# Patient Record
Sex: FEMALE | Race: WHITE | NOT HISPANIC OR LATINO | Employment: STUDENT | URBAN - NONMETROPOLITAN AREA
[De-identification: names, ages, dates, MRNs, and addresses within clinical notes are randomized per-mention and may not be internally consistent; named-entity substitution may affect disease eponyms.]

---
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
## 2021-07-20 ENCOUNTER — OFFICE VISIT (OUTPATIENT)
Dept: FAMILY MEDICINE CLINIC | Facility: CLINIC | Age: 21
End: 2021-07-20

## 2021-07-20 ENCOUNTER — LAB (OUTPATIENT)
Dept: LAB | Facility: HOSPITAL | Age: 21
End: 2021-07-20

## 2021-07-20 VITALS
SYSTOLIC BLOOD PRESSURE: 120 MMHG | BODY MASS INDEX: 33 KG/M2 | DIASTOLIC BLOOD PRESSURE: 80 MMHG | WEIGHT: 179.3 LBS | HEIGHT: 62 IN | HEART RATE: 99 BPM | OXYGEN SATURATION: 99 %

## 2021-07-20 DIAGNOSIS — Z12.4 ENCOUNTER FOR SCREENING FOR CERVICAL CANCER: ICD-10-CM

## 2021-07-20 DIAGNOSIS — F41.9 ANXIETY: ICD-10-CM

## 2021-07-20 DIAGNOSIS — Z13.29 SCREENING FOR HYPOTHYROIDISM: ICD-10-CM

## 2021-07-20 DIAGNOSIS — Z11.59 NEED FOR HEPATITIS C SCREENING TEST: ICD-10-CM

## 2021-07-20 DIAGNOSIS — N92.6 IRREGULAR MENSTRUAL BLEEDING: ICD-10-CM

## 2021-07-20 DIAGNOSIS — Z13.220 SCREENING FOR HYPERCHOLESTEROLEMIA: ICD-10-CM

## 2021-07-20 DIAGNOSIS — Z00.00 ANNUAL PHYSICAL EXAM: Primary | ICD-10-CM

## 2021-07-20 LAB
BASOPHILS # BLD AUTO: 0.04 10*3/MM3 (ref 0–0.2)
BASOPHILS NFR BLD AUTO: 0.5 % (ref 0–1.5)
DEPRECATED RDW RBC AUTO: 40.1 FL (ref 37–54)
EOSINOPHIL # BLD AUTO: 0.09 10*3/MM3 (ref 0–0.4)
EOSINOPHIL NFR BLD AUTO: 1.1 % (ref 0.3–6.2)
ERYTHROCYTE [DISTWIDTH] IN BLOOD BY AUTOMATED COUNT: 13 % (ref 12.3–15.4)
HCG SERPL QL: NEGATIVE
HCT VFR BLD AUTO: 36.4 % (ref 34–46.6)
HCV AB SER DONR QL: NORMAL
HGB BLD-MCNC: 11.9 G/DL (ref 12–15.9)
IMM GRANULOCYTES # BLD AUTO: 0.03 10*3/MM3 (ref 0–0.05)
IMM GRANULOCYTES NFR BLD AUTO: 0.4 % (ref 0–0.5)
LYMPHOCYTES # BLD AUTO: 2.83 10*3/MM3 (ref 0.7–3.1)
LYMPHOCYTES NFR BLD AUTO: 34.4 % (ref 19.6–45.3)
MCH RBC QN AUTO: 27.5 PG (ref 26.6–33)
MCHC RBC AUTO-ENTMCNC: 32.7 G/DL (ref 31.5–35.7)
MCV RBC AUTO: 84.3 FL (ref 79–97)
MONOCYTES # BLD AUTO: 0.73 10*3/MM3 (ref 0.1–0.9)
MONOCYTES NFR BLD AUTO: 8.9 % (ref 5–12)
NEUTROPHILS NFR BLD AUTO: 4.5 10*3/MM3 (ref 1.7–7)
NEUTROPHILS NFR BLD AUTO: 54.7 % (ref 42.7–76)
NRBC BLD AUTO-RTO: 0 /100 WBC (ref 0–0.2)
PLATELET # BLD AUTO: 300 10*3/MM3 (ref 140–450)
PMV BLD AUTO: 10.4 FL (ref 6–12)
RBC # BLD AUTO: 4.32 10*6/MM3 (ref 3.77–5.28)
WBC # BLD AUTO: 8.22 10*3/MM3 (ref 3.4–10.8)

## 2021-07-20 PROCEDURE — 80061 LIPID PANEL: CPT

## 2021-07-20 PROCEDURE — 84703 CHORIONIC GONADOTROPIN ASSAY: CPT

## 2021-07-20 PROCEDURE — 86803 HEPATITIS C AB TEST: CPT

## 2021-07-20 PROCEDURE — 80050 GENERAL HEALTH PANEL: CPT

## 2021-07-20 PROCEDURE — 99395 PREV VISIT EST AGE 18-39: CPT | Performed by: NURSE PRACTITIONER

## 2021-07-20 NOTE — PROGRESS NOTES
"Chief Complaint  Annual Exam    Subjective  Has been living with her boyfriend for close to a year and stopped taking her birth control.  Reports menstrual cycles have been irregular.  \"Pretty sure last month I had a miscarriage so        Nikkie Ross presents to [facility]  HPI:  Annual physical exam   Menstrual Problem  This is a chronic problem. The current episode started more than 1 month ago. The problem occurs intermittently. The problem has been waxing and waning. Associated symptoms include abdominal pain. Nothing aggravates the symptoms. She has tried nothing for the symptoms. The treatment provided no relief.       Objective   Vital Signs:   /80   Pulse 99   Ht 157.5 cm (62\")   Wt 81.3 kg (179 lb 4.8 oz)   SpO2 99%   BMI 32.79 kg/m²     Physical Exam  Vitals and nursing note reviewed. Exam conducted with a chaperone present.   Constitutional:       Appearance: Normal appearance. She is well-developed. She is obese.   HENT:      Head: Normocephalic and atraumatic.      Right Ear: Tympanic membrane, ear canal and external ear normal.      Left Ear: Tympanic membrane, ear canal and external ear normal.      Mouth/Throat:      Mouth: Mucous membranes are moist.      Pharynx: Oropharynx is clear.   Eyes:      Extraocular Movements: Extraocular movements intact.      Conjunctiva/sclera: Conjunctivae normal.      Pupils: Pupils are equal, round, and reactive to light.   Cardiovascular:      Rate and Rhythm: Normal rate and regular rhythm.      Pulses: Normal pulses.      Heart sounds: Normal heart sounds.   Pulmonary:      Effort: Pulmonary effort is normal.      Breath sounds: Normal breath sounds.   Chest:      Comments: Bilateral, manual breast exam performed and no dimpling, puckering, masses or nodules palpated    Abdominal:      General: Bowel sounds are normal.      Palpations: Abdomen is soft.   Genitourinary:     General: Normal vulva.      Labia:    "      Right: No rash or tenderness.         Left: No rash or tenderness.       Urethra: No prolapse.      Vagina: Normal.      Cervix: Normal.      Uterus: Absent.       Adnexa: Right adnexa normal and left adnexa normal.      Rectum: Normal.   Musculoskeletal:         General: Normal range of motion.      Cervical back: Normal range of motion and neck supple.   Skin:     General: Skin is warm.      Capillary Refill: Capillary refill takes less than 2 seconds.   Neurological:      Mental Status: She is alert and oriented to person, place, and time.   Psychiatric:         Behavior: Behavior normal.        Result Review :                   Assessment and Plan    Diagnoses and all orders for this visit:    1. Annual physical exam (Primary)    2. Irregular menstrual bleeding  -     CBC & Differential; Future  -     Comprehensive Metabolic Panel; Future  -     hCG, Serum, Qualitative; Future    3. Screening for hypercholesterolemia  -     Lipid panel; Future    4. Screening for hypothyroidism  -     TSH; Future    5. Encounter for screening for cervical cancer   -     Liquid-based Pap Smear, Screening    6. Need for hepatitis C screening test  -     Hepatitis C antibody; Future      Complete fasting lab work as ordered and will notify of results when available  Encouraged to begin prenatal vitamins with folic acid as she has been attempting pregnancy since discontinuing contraception  ThinPrep Pap smear collected and sent to lab and will notify of results when available  Encouraged bimonthly manual breast exams at home  Counseled on importance of healthy eating habits and regular physical activity regimen on improving overall physical mental health  I spent 26 minutes caring for Nikkie on this date of service. This time includes time spent by me in the following activities:preparing for the visit, reviewing tests, obtaining and/or reviewing a separately obtained history, performing a medically appropriate examination  and/or evaluation , counseling and educating the patient/family/caregiver, ordering medications, tests, or procedures and documenting information in the medical record  Follow Up   Return in about 3 months (around 10/20/2021) for Recheck.  Patient was given instructions and counseling regarding her condition or for health maintenance advice. Please see specific information pulled into the AVS if appropriate.         This document has been electronically signed by TOSHA Mcnamara on July 20, 2021 12:39 CDT

## 2021-07-21 LAB
ALBUMIN SERPL-MCNC: 4.7 G/DL (ref 3.5–5.2)
ALBUMIN/GLOB SERPL: 2.1 G/DL
ALP SERPL-CCNC: 72 U/L (ref 39–117)
ALT SERPL W P-5'-P-CCNC: 17 U/L (ref 1–33)
ANION GAP SERPL CALCULATED.3IONS-SCNC: 12.8 MMOL/L (ref 5–15)
AST SERPL-CCNC: 19 U/L (ref 1–32)
BILIRUB SERPL-MCNC: 0.4 MG/DL (ref 0–1.2)
BUN SERPL-MCNC: 8 MG/DL (ref 6–20)
BUN/CREAT SERPL: 14.5 (ref 7–25)
CALCIUM SPEC-SCNC: 9.2 MG/DL (ref 8.6–10.5)
CHLORIDE SERPL-SCNC: 106 MMOL/L (ref 98–107)
CHOLEST SERPL-MCNC: 124 MG/DL (ref 0–200)
CO2 SERPL-SCNC: 22.2 MMOL/L (ref 22–29)
CREAT SERPL-MCNC: 0.55 MG/DL (ref 0.57–1)
GFR SERPL CREATININE-BSD FRML MDRD: 141 ML/MIN/1.73
GLOBULIN UR ELPH-MCNC: 2.2 GM/DL
GLUCOSE SERPL-MCNC: 83 MG/DL (ref 65–99)
HDLC SERPL-MCNC: 50 MG/DL (ref 40–60)
LDLC SERPL CALC-MCNC: 61 MG/DL (ref 0–100)
LDLC/HDLC SERPL: 1.24 {RATIO}
POTASSIUM SERPL-SCNC: 4.1 MMOL/L (ref 3.5–5.2)
PROT SERPL-MCNC: 6.9 G/DL (ref 6–8.5)
SODIUM SERPL-SCNC: 141 MMOL/L (ref 136–145)
TRIGL SERPL-MCNC: 60 MG/DL (ref 0–150)
TSH SERPL DL<=0.05 MIU/L-ACNC: 1.43 UIU/ML (ref 0.27–4.2)
VLDLC SERPL-MCNC: 13 MG/DL (ref 5–40)

## 2021-07-27 ENCOUNTER — TELEPHONE (OUTPATIENT)
Dept: FAMILY MEDICINE CLINIC | Facility: CLINIC | Age: 21
End: 2021-07-27

## 2021-07-27 LAB
LAB AP CASE REPORT: NORMAL
PATH INTERP SPEC-IMP: NORMAL

## 2021-07-27 NOTE — TELEPHONE ENCOUNTER
Per TOSHA Benitez, Ms. Ross has been called with recent Pap Smear results & recommendations.  Continue current medications and follow-up as planned or sooner if any problems.         ----- Message from TOSHA Mcnamara sent at 7/27/2021  9:04 AM CDT -----  Pap smear was unsatisfactory due to the lubricant used.  She needs to have a repeat Pap smear performed.  Have her schedule appointment with my office.  We will be using a different lubricant at that time

## 2021-07-27 NOTE — PROGRESS NOTES
Per TOSHA Benitez, Ms. Ross has been called with recent Pap Smear results & recommendations.  Continue current medications and follow-up as planned or sooner if any problems.

## 2022-10-13 ENCOUNTER — LAB (OUTPATIENT)
Dept: LAB | Facility: HOSPITAL | Age: 22
End: 2022-10-13

## 2022-10-13 ENCOUNTER — OFFICE VISIT (OUTPATIENT)
Dept: FAMILY MEDICINE CLINIC | Facility: CLINIC | Age: 22
End: 2022-10-13

## 2022-10-13 VITALS
DIASTOLIC BLOOD PRESSURE: 66 MMHG | WEIGHT: 193.7 LBS | SYSTOLIC BLOOD PRESSURE: 108 MMHG | BODY MASS INDEX: 35.64 KG/M2 | OXYGEN SATURATION: 99 % | HEIGHT: 62 IN | HEART RATE: 118 BPM

## 2022-10-13 DIAGNOSIS — Z11.8 SCREENING FOR CHLAMYDIAL DISEASE: ICD-10-CM

## 2022-10-13 DIAGNOSIS — Z23 NEED FOR IMMUNIZATION AGAINST INFLUENZA: ICD-10-CM

## 2022-10-13 DIAGNOSIS — Z00.00 ANNUAL PHYSICAL EXAM: Primary | ICD-10-CM

## 2022-10-13 DIAGNOSIS — N92.6 IRREGULAR MENSTRUAL BLEEDING: ICD-10-CM

## 2022-10-13 PROCEDURE — 87661 TRICHOMONAS VAGINALIS AMPLIF: CPT

## 2022-10-13 PROCEDURE — 90471 IMMUNIZATION ADMIN: CPT | Performed by: NURSE PRACTITIONER

## 2022-10-13 PROCEDURE — 3008F BODY MASS INDEX DOCD: CPT | Performed by: NURSE PRACTITIONER

## 2022-10-13 PROCEDURE — 90686 IIV4 VACC NO PRSV 0.5 ML IM: CPT | Performed by: NURSE PRACTITIONER

## 2022-10-13 PROCEDURE — 2014F MENTAL STATUS ASSESS: CPT | Performed by: NURSE PRACTITIONER

## 2022-10-13 PROCEDURE — 87491 CHLMYD TRACH DNA AMP PROBE: CPT

## 2022-10-13 PROCEDURE — 83002 ASSAY OF GONADOTROPIN (LH): CPT

## 2022-10-13 PROCEDURE — 99395 PREV VISIT EST AGE 18-39: CPT | Performed by: NURSE PRACTITIONER

## 2022-10-13 PROCEDURE — 83001 ASSAY OF GONADOTROPIN (FSH): CPT

## 2022-10-13 PROCEDURE — 82670 ASSAY OF TOTAL ESTRADIOL: CPT

## 2022-10-13 PROCEDURE — 80050 GENERAL HEALTH PANEL: CPT

## 2022-10-13 PROCEDURE — 36415 COLL VENOUS BLD VENIPUNCTURE: CPT

## 2022-10-13 PROCEDURE — 87591 N.GONORRHOEAE DNA AMP PROB: CPT

## 2022-10-13 NOTE — PROGRESS NOTES
"Chief Complaint  Annual Exam    Subjective   Over the past several years has been having irregular menstrual periods \"but I started tracking them in my samantha over a year ago and they are all over the place.  They are really heavy and super crampy.\"          Nikkie Ross presents to [facility] [place]  History of Present Illness  HPI: Annual physical exam  Menstrual Problem  This is a chronic problem. The current episode started more than 1 year ago. The problem occurs constantly. The problem has been gradually worsening. Associated symptoms include abdominal pain and fatigue. Nothing aggravates the symptoms. She has tried nothing for the symptoms. The treatment provided no relief.     No current outpatient medications on file prior to visit.     No current facility-administered medications on file prior to visit.     Allergies   Allergen Reactions   • Codeine Rash     Hives.       Objective   Vital Signs:  /66   Pulse 118   Ht 157.5 cm (62\")   Wt 87.9 kg (193 lb 11.2 oz)   SpO2 99%   BMI 35.43 kg/m²   Estimated body mass index is 35.43 kg/m² as calculated from the following:    Height as of this encounter: 157.5 cm (62\").    Weight as of this encounter: 87.9 kg (193 lb 11.2 oz).        Physical Exam  Vitals and nursing note reviewed.   Constitutional:       Appearance: Normal appearance. She is well-developed. She is obese.   HENT:      Head: Normocephalic and atraumatic.      Right Ear: Tympanic membrane, ear canal and external ear normal.      Left Ear: Tympanic membrane, ear canal and external ear normal.      Nose: Nose normal.      Mouth/Throat:      Mouth: Mucous membranes are moist.      Pharynx: Oropharynx is clear.   Eyes:      Extraocular Movements: Extraocular movements intact.      Conjunctiva/sclera: Conjunctivae normal.      Pupils: Pupils are equal, round, and reactive to light.   Cardiovascular:      Rate and Rhythm: Normal rate and " regular rhythm.      Pulses: Normal pulses.      Heart sounds: Normal heart sounds.   Pulmonary:      Effort: Pulmonary effort is normal.      Breath sounds: Normal breath sounds.   Chest:      Comments: Bilateral, manual breast exam performed and no dimpling, puckering, masses or nodules palpated    Abdominal:      General: Bowel sounds are normal.      Palpations: Abdomen is soft.   Genitourinary:     Comments: Gynecological exam deferred to OB/GYN due to her being on menstrual cycle at this time  Musculoskeletal:         General: Normal range of motion.      Cervical back: Normal range of motion and neck supple.   Skin:     General: Skin is warm.      Capillary Refill: Capillary refill takes less than 2 seconds.   Neurological:      Mental Status: She is alert and oriented to person, place, and time.   Psychiatric:         Behavior: Behavior normal.        Result Review :  The following data was reviewed by: TOSHA Mcnamara on 10/13/2022:  CMP    CMP 10/13/22   Glucose 80   BUN 9   Creatinine 0.64   Sodium 140   Potassium 3.9   Chloride 105   Calcium 8.9   Albumin 4.70   Total Bilirubin 0.5   Alkaline Phosphatase 75   AST (SGOT) 21   ALT (SGPT) 10           CBC    CBC 10/13/22   WBC 10.15   RBC 4.30   Hemoglobin 12.2   Hematocrit 36.3   MCV 84.4   MCH 28.4   MCHC 33.6   RDW 13.3   Platelets 351                     Assessment and Plan   Diagnoses and all orders for this visit:    1. Annual physical exam (Primary)    2. Irregular menstrual bleeding  -     CBC & Differential; Future  -     Comprehensive Metabolic Panel; Future  -     TSH; Future  -     Estradiol; Future  -     FSH & LH; Future  -     US Non-ob Transvaginal; Future  -     Ambulatory Referral to Obstetrics / Gynecology    3. Need for immunization against influenza  -     FluLaval/Fluzone >6 mos ([id number])    4. Screening for chlamydial disease  -     Chlamydia trachomatis, Neisseria gonorrhoeae, Trichomonas vaginalis, PCR - Urine, Urine, Clean  Catch; Future      1.  Annual physical exam:  Continue on current medications as previously prescribed   Counseling on importance of heathy eating habits and regular physical activity regimen on improving overall physical and mental health.     2.  Irregular menstrual chlamydia, gonorrhea:  Complete CBC, chemistry panel, TSH, estradiol, FSH and LH as ordered and will notify of results when available  Radiology will call to schedule transvaginal ultrasound will notify of results when available  Referral placed to OB/GYN and will contact patient to schedule appointment    30.  Need for immunization against influenza:  Influenza vaccine given IM in office  Educated on possible side effects of this medication including but not limited to pain, swelling and redness of injection site as well as flulike symptoms    4.  Screening for chlamydial disease:  Complete chlamydia PCR as ordered and will notify of results when available       Follow Up   Return in about 1 year (around 10/13/2023) for Annual physical.  Patient was given instructions and counseling regarding her condition or for health maintenance advice. Please see specific information pulled into the AVS if appropriate.         This document has been electronically signed by TOSHA Mcnamara on October 14, 2022 06:59 CDT

## 2022-10-14 ENCOUNTER — TELEPHONE (OUTPATIENT)
Dept: FAMILY MEDICINE CLINIC | Facility: CLINIC | Age: 22
End: 2022-10-14

## 2022-10-14 LAB
ALBUMIN SERPL-MCNC: 4.7 G/DL (ref 3.5–5.2)
ALBUMIN/GLOB SERPL: 1.9 G/DL
ALP SERPL-CCNC: 75 U/L (ref 39–117)
ALT SERPL W P-5'-P-CCNC: 10 U/L (ref 1–33)
ANION GAP SERPL CALCULATED.3IONS-SCNC: 10.8 MMOL/L (ref 5–15)
AST SERPL-CCNC: 21 U/L (ref 1–32)
BASOPHILS # BLD AUTO: 0.03 10*3/MM3 (ref 0–0.2)
BASOPHILS NFR BLD AUTO: 0.3 % (ref 0–1.5)
BILIRUB SERPL-MCNC: 0.5 MG/DL (ref 0–1.2)
BUN SERPL-MCNC: 9 MG/DL (ref 6–20)
BUN/CREAT SERPL: 14.1 (ref 7–25)
C TRACH RRNA CVX QL NAA+PROBE: NEGATIVE
CALCIUM SPEC-SCNC: 8.9 MG/DL (ref 8.6–10.5)
CHLORIDE SERPL-SCNC: 105 MMOL/L (ref 98–107)
CO2 SERPL-SCNC: 24.2 MMOL/L (ref 22–29)
CREAT SERPL-MCNC: 0.64 MG/DL (ref 0.57–1)
DEPRECATED RDW RBC AUTO: 40.7 FL (ref 37–54)
EGFRCR SERPLBLD CKD-EPI 2021: 129.1 ML/MIN/1.73
EOSINOPHIL # BLD AUTO: 0.06 10*3/MM3 (ref 0–0.4)
EOSINOPHIL NFR BLD AUTO: 0.6 % (ref 0.3–6.2)
ERYTHROCYTE [DISTWIDTH] IN BLOOD BY AUTOMATED COUNT: 13.3 % (ref 12.3–15.4)
ESTRADIOL SERPL HS-MCNC: 20.3 PG/ML
FSH SERPL-ACNC: 5.91 MIU/ML
GLOBULIN UR ELPH-MCNC: 2.5 GM/DL
GLUCOSE SERPL-MCNC: 80 MG/DL (ref 65–99)
HCT VFR BLD AUTO: 36.3 % (ref 34–46.6)
HGB BLD-MCNC: 12.2 G/DL (ref 12–15.9)
IMM GRANULOCYTES # BLD AUTO: 0.02 10*3/MM3 (ref 0–0.05)
IMM GRANULOCYTES NFR BLD AUTO: 0.2 % (ref 0–0.5)
LH SERPL-ACNC: 4.04 MIU/ML
LYMPHOCYTES # BLD AUTO: 3.05 10*3/MM3 (ref 0.7–3.1)
LYMPHOCYTES NFR BLD AUTO: 30 % (ref 19.6–45.3)
MCH RBC QN AUTO: 28.4 PG (ref 26.6–33)
MCHC RBC AUTO-ENTMCNC: 33.6 G/DL (ref 31.5–35.7)
MCV RBC AUTO: 84.4 FL (ref 79–97)
MONOCYTES # BLD AUTO: 0.74 10*3/MM3 (ref 0.1–0.9)
MONOCYTES NFR BLD AUTO: 7.3 % (ref 5–12)
N GONORRHOEA RRNA SPEC QL NAA+PROBE: NEGATIVE
NEUTROPHILS NFR BLD AUTO: 6.25 10*3/MM3 (ref 1.7–7)
NEUTROPHILS NFR BLD AUTO: 61.6 % (ref 42.7–76)
NRBC BLD AUTO-RTO: 0 /100 WBC (ref 0–0.2)
PLATELET # BLD AUTO: 351 10*3/MM3 (ref 140–450)
PMV BLD AUTO: 10.5 FL (ref 6–12)
POTASSIUM SERPL-SCNC: 3.9 MMOL/L (ref 3.5–5.2)
PROT SERPL-MCNC: 7.2 G/DL (ref 6–8.5)
RBC # BLD AUTO: 4.3 10*6/MM3 (ref 3.77–5.28)
SODIUM SERPL-SCNC: 140 MMOL/L (ref 136–145)
TRICHOMONAS VAGINALIS PCR: NEGATIVE
TSH SERPL DL<=0.05 MIU/L-ACNC: 1.55 UIU/ML (ref 0.27–4.2)
WBC NRBC COR # BLD: 10.15 10*3/MM3 (ref 3.4–10.8)

## 2022-10-14 NOTE — TELEPHONE ENCOUNTER
-Per TOSHA Benitez, Ms. Ross has been called with recent lab results & recommendations.  Continue current medications and follow-up as planned or sooner if any problems.       ---- Message from TOSHA Mcnamara sent at 10/14/2022  6:17 AM CDT -----  Sugar, sodium, potassium, kidney function, liver function, thyroid and hormone levels were within normal limits.

## 2022-10-14 NOTE — TELEPHONE ENCOUNTER
Per TOSHA Benitez, Ms. Ross has been called with recent lab results & recommendations.  Continue current medications and follow-up as planned or sooner if any problems.   ----- Message from TOSHA Mcnamara sent at 10/14/2022 10:04 AM CDT -----  Labs normal, please call or send card!

## 2022-10-14 NOTE — PROGRESS NOTES
Per TOSHA Benitez, Ms. Ross has been called with recent lab results & recommendations.  Continue current medications and follow-up as planned or sooner if any problems.

## 2022-10-18 ENCOUNTER — HOSPITAL ENCOUNTER (OUTPATIENT)
Dept: ULTRASOUND IMAGING | Facility: HOSPITAL | Age: 22
Discharge: HOME | End: 2022-10-18
Admitting: NURSE PRACTITIONER

## 2022-10-18 DIAGNOSIS — N92.6 IRREGULAR MENSTRUAL BLEEDING: ICD-10-CM

## 2022-10-18 PROCEDURE — 76830 TRANSVAGINAL US NON-OB: CPT

## 2022-10-19 ENCOUNTER — TELEPHONE (OUTPATIENT)
Dept: FAMILY MEDICINE CLINIC | Facility: CLINIC | Age: 22
End: 2022-10-19

## 2022-10-19 NOTE — PROGRESS NOTES
Per TOSHA Benitez, Ms. Ross has been called with recent Transvaginal US results & recommendations.  Continue current medications and follow-up as planned or sooner if any problems.

## 2022-10-19 NOTE — TELEPHONE ENCOUNTER
Per TOSHA Benitez, Ms. Ross has been called with recent Transvaginal US results & recommendations.  Continue current medications and follow-up as planned or sooner if any problems.       ----- Message from TOSHA Mcnamara sent at 10/19/2022  7:20 AM CDT -----  Ultrasound showed nabothian cyst on cervix.  Generally these are benign and require no treatment but have her continue to follow-up with Dr. Tobin, OB/GYN as referred.

## 2023-01-20 ENCOUNTER — LAB (OUTPATIENT)
Dept: LAB | Facility: HOSPITAL | Age: 23
End: 2023-01-20
Payer: MEDICAID

## 2023-01-20 ENCOUNTER — OFFICE VISIT (OUTPATIENT)
Dept: OBSTETRICS AND GYNECOLOGY | Facility: CLINIC | Age: 23
End: 2023-01-20
Payer: MEDICAID

## 2023-01-20 VITALS
DIASTOLIC BLOOD PRESSURE: 80 MMHG | SYSTOLIC BLOOD PRESSURE: 116 MMHG | BODY MASS INDEX: 36.07 KG/M2 | WEIGHT: 196 LBS | HEIGHT: 62 IN

## 2023-01-20 DIAGNOSIS — Z32.01 PREGNANCY TEST POSITIVE: ICD-10-CM

## 2023-01-20 DIAGNOSIS — N91.2 AMENORRHEA: Primary | ICD-10-CM

## 2023-01-20 PROBLEM — R22.0 SCALP MASS: Status: RESOLVED | Noted: 2018-04-24 | Resolved: 2023-01-20

## 2023-01-20 LAB
B-HCG UR QL: POSITIVE
EXPIRATION DATE: ABNORMAL
HCG INTACT+B SERPL-ACNC: NORMAL MIU/ML
INTERNAL NEGATIVE CONTROL: NEGATIVE
INTERNAL POSITIVE CONTROL: POSITIVE
Lab: ABNORMAL

## 2023-01-20 PROCEDURE — 36415 COLL VENOUS BLD VENIPUNCTURE: CPT | Performed by: OBSTETRICS & GYNECOLOGY

## 2023-01-20 PROCEDURE — 84702 CHORIONIC GONADOTROPIN TEST: CPT | Performed by: OBSTETRICS & GYNECOLOGY

## 2023-01-20 PROCEDURE — 81025 URINE PREGNANCY TEST: CPT | Performed by: OBSTETRICS & GYNECOLOGY

## 2023-01-20 PROCEDURE — 99203 OFFICE O/P NEW LOW 30 MIN: CPT | Performed by: OBSTETRICS & GYNECOLOGY

## 2023-01-20 RX ORDER — PRENATAL VIT NO.126/IRON/FOLIC 28MG-0.8MG
TABLET ORAL DAILY
COMMUNITY
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
## 2023-01-26 ENCOUNTER — LAB (OUTPATIENT)
Dept: LAB | Facility: HOSPITAL | Age: 23
End: 2023-01-26
Payer: MEDICAID

## 2023-01-26 ENCOUNTER — INITIAL PRENATAL (OUTPATIENT)
Dept: OBSTETRICS AND GYNECOLOGY | Facility: CLINIC | Age: 23
End: 2023-01-26
Payer: MEDICAID

## 2023-01-26 VITALS — DIASTOLIC BLOOD PRESSURE: 68 MMHG | SYSTOLIC BLOOD PRESSURE: 104 MMHG | WEIGHT: 192.6 LBS | BODY MASS INDEX: 35.23 KG/M2

## 2023-01-26 DIAGNOSIS — E66.9 OBESITY (BMI 30-39.9): ICD-10-CM

## 2023-01-26 DIAGNOSIS — F12.90 MARIJUANA SMOKER: ICD-10-CM

## 2023-01-26 DIAGNOSIS — O09.299 HISTORY OF MISCARRIAGE, CURRENTLY PREGNANT, UNSPECIFIED TRIMESTER: Primary | ICD-10-CM

## 2023-01-26 DIAGNOSIS — O36.80X0 ENCOUNTER TO DETERMINE FETAL VIABILITY OF PREGNANCY, SINGLE OR UNSPECIFIED FETUS: ICD-10-CM

## 2023-01-26 DIAGNOSIS — Z32.01 POSITIVE PREGNANCY TEST: ICD-10-CM

## 2023-01-26 LAB
AMPHET+METHAMPHET UR QL: NEGATIVE
AMPHETAMINES UR QL: NEGATIVE
BARBITURATES UR QL SCN: NEGATIVE
BENZODIAZ UR QL SCN: NEGATIVE
BILIRUB UR QL STRIP: NEGATIVE
BUPRENORPHINE SERPL-MCNC: NEGATIVE NG/ML
CANNABINOIDS SERPL QL: POSITIVE
CLARITY UR: ABNORMAL
COCAINE UR QL: NEGATIVE
COLOR UR: YELLOW
GLUCOSE UR STRIP-MCNC: NEGATIVE MG/DL
HGB UR QL STRIP.AUTO: NEGATIVE
KETONES UR QL STRIP: NEGATIVE
LEUKOCYTE ESTERASE UR QL STRIP.AUTO: ABNORMAL
METHADONE UR QL SCN: NEGATIVE
NITRITE UR QL STRIP: NEGATIVE
OPIATES UR QL: NEGATIVE
OXYCODONE UR QL SCN: NEGATIVE
PCP UR QL SCN: NEGATIVE
PH UR STRIP.AUTO: 6 [PH] (ref 5–8)
PROPOXYPH UR QL: NEGATIVE
PROT UR QL STRIP: NEGATIVE
SP GR UR STRIP: 1.02 (ref 1–1.03)
TRICYCLICS UR QL SCN: NEGATIVE
UROBILINOGEN UR QL STRIP: ABNORMAL

## 2023-01-26 PROCEDURE — 87591 N.GONORRHOEAE DNA AMP PROB: CPT | Performed by: OBSTETRICS & GYNECOLOGY

## 2023-01-26 PROCEDURE — 87086 URINE CULTURE/COLONY COUNT: CPT | Performed by: OBSTETRICS & GYNECOLOGY

## 2023-01-26 PROCEDURE — G0480 DRUG TEST DEF 1-7 CLASSES: HCPCS | Performed by: OBSTETRICS & GYNECOLOGY

## 2023-01-26 PROCEDURE — 80306 DRUG TEST PRSMV INSTRMNT: CPT | Performed by: OBSTETRICS & GYNECOLOGY

## 2023-01-26 PROCEDURE — 81003 URINALYSIS AUTO W/O SCOPE: CPT | Performed by: OBSTETRICS & GYNECOLOGY

## 2023-01-26 PROCEDURE — 87491 CHLMYD TRACH DNA AMP PROBE: CPT | Performed by: OBSTETRICS & GYNECOLOGY

## 2023-01-26 PROCEDURE — 87661 TRICHOMONAS VAGINALIS AMPLIF: CPT | Performed by: OBSTETRICS & GYNECOLOGY

## 2023-01-26 NOTE — PROGRESS NOTES
I spent approximately 60 minutes with the patient acquiring the health and history intake, reviewing prior records, discussing topics related to healthy pregnancy, entering orders, and documentation. Her LMP is 11/18/22. She saw Dr. Tobin on 1/20/23. She had a beta HCG quant level that day that was 12,711.  This is her 2nd pregnancy. She reports that she had a miscarriage in June 2021.   She has history of anxiety and depression. She filled out the depression screening questionnaire and scored 8.   A newob bag is given. The 1st trimester teaching was done with the patient. We discussed a healthy diet and exercise and what is recommended. I also discussed Listeriosis and Toxoplasmosis. She does not eat fish. I informed patient not to be in hot tubs, saunas, or tanning beds. We discussed that spotting may occur after intercourse which is common, but if heavy bleeding like a period occurs to [facility] if clinic is closed.  I encouraged her to make an appointment with the dentist if she has not had a dental exam and cleaning in the last 6 months.  I instructed the patient that alcohol, illicit drug use, and tobacco smoking are not recommended in pregnancy. She quit vaping. She plans to quit smoking marijuana. She says it helps her sleep at night. I encouraged her to quit smoking marijuana. I talked to her about other alternatives to help her sleep. I also pointed out the medication list in the book that has options as well.  She plans to breastfeed first and then switch to formula. I encouraged the patient to get the TDAP vaccine in the 3rd trimester.  I discussed with the patient that a pediatrician needs to be chosen prior to delivery for the infant to have an appointment scheduled before leaving the hospital.  I discussed lab tests will be done today. She will need a pap smear. An early 1hr glucola is ordered for BMI 35. All questions were answered at this time. She is scheduled for an  ultrasound and to see Greta GIVENS on 2/9/23.

## 2023-01-27 ENCOUNTER — LAB (OUTPATIENT)
Dept: LAB | Facility: HOSPITAL | Age: 23
End: 2023-01-27
Payer: MEDICAID

## 2023-01-27 LAB
ABO GROUP BLD: NORMAL
BACTERIA SPEC AEROBE CULT: NORMAL
BASOPHILS # BLD AUTO: 0.05 10*3/MM3 (ref 0–0.2)
BASOPHILS NFR BLD AUTO: 0.4 % (ref 0–1.5)
BLD GP AB SCN SERPL QL: NEGATIVE
C TRACH RRNA CVX QL NAA+PROBE: NEGATIVE
DEPRECATED RDW RBC AUTO: 38.9 FL (ref 37–54)
EOSINOPHIL # BLD AUTO: 0.05 10*3/MM3 (ref 0–0.4)
EOSINOPHIL NFR BLD AUTO: 0.4 % (ref 0.3–6.2)
ERYTHROCYTE [DISTWIDTH] IN BLOOD BY AUTOMATED COUNT: 12.8 % (ref 12.3–15.4)
HBV SURFACE AG SERPL QL IA: NORMAL
HCG INTACT+B SERPL-ACNC: NORMAL MIU/ML
HCT VFR BLD AUTO: 35.3 % (ref 34–46.6)
HCV AB SER DONR QL: NORMAL
HGB BLD-MCNC: 11.8 G/DL (ref 12–15.9)
HIV1+2 AB SER QL: NORMAL
IMM GRANULOCYTES # BLD AUTO: 0.04 10*3/MM3 (ref 0–0.05)
IMM GRANULOCYTES NFR BLD AUTO: 0.3 % (ref 0–0.5)
LYMPHOCYTES # BLD AUTO: 3.31 10*3/MM3 (ref 0.7–3.1)
LYMPHOCYTES NFR BLD AUTO: 28.1 % (ref 19.6–45.3)
Lab: NORMAL
MCH RBC QN AUTO: 27.7 PG (ref 26.6–33)
MCHC RBC AUTO-ENTMCNC: 33.4 G/DL (ref 31.5–35.7)
MCV RBC AUTO: 82.9 FL (ref 79–97)
MONOCYTES # BLD AUTO: 0.91 10*3/MM3 (ref 0.1–0.9)
MONOCYTES NFR BLD AUTO: 7.7 % (ref 5–12)
N GONORRHOEA RRNA SPEC QL NAA+PROBE: NEGATIVE
NEUTROPHILS NFR BLD AUTO: 63.1 % (ref 42.7–76)
NEUTROPHILS NFR BLD AUTO: 7.43 10*3/MM3 (ref 1.7–7)
NRBC BLD AUTO-RTO: 0 /100 WBC (ref 0–0.2)
PLATELET # BLD AUTO: 336 10*3/MM3 (ref 140–450)
PMV BLD AUTO: 9.9 FL (ref 6–12)
RBC # BLD AUTO: 4.26 10*6/MM3 (ref 3.77–5.28)
RH BLD: POSITIVE
TRICHOMONAS VAGINALIS PCR: NEGATIVE
WBC NRBC COR # BLD: 11.79 10*3/MM3 (ref 3.4–10.8)

## 2023-01-27 PROCEDURE — 86803 HEPATITIS C AB TEST: CPT | Performed by: OBSTETRICS & GYNECOLOGY

## 2023-01-27 PROCEDURE — 36415 COLL VENOUS BLD VENIPUNCTURE: CPT

## 2023-01-27 PROCEDURE — 84702 CHORIONIC GONADOTROPIN TEST: CPT | Performed by: OBSTETRICS & GYNECOLOGY

## 2023-01-27 PROCEDURE — 80081 OBSTETRIC PANEL INC HIV TSTG: CPT | Performed by: OBSTETRICS & GYNECOLOGY

## 2023-01-28 LAB
RPR SER QL: NORMAL
RUBV IGG SERPL IA-ACNC: 1.74 INDEX

## 2023-02-03 LAB
CANNABINOIDS UR QL CFM: NORMAL
CARBOXYTHC/CREAT UR: 325 NG/MG CREAT
LEVEL OF DETECTION:: NORMAL

## 2023-02-09 ENCOUNTER — ROUTINE PRENATAL (OUTPATIENT)
Dept: OBSTETRICS AND GYNECOLOGY | Facility: CLINIC | Age: 23
End: 2023-02-09
Payer: MEDICAID

## 2023-02-09 VITALS — SYSTOLIC BLOOD PRESSURE: 116 MMHG | BODY MASS INDEX: 35.85 KG/M2 | DIASTOLIC BLOOD PRESSURE: 72 MMHG | WEIGHT: 196 LBS

## 2023-02-09 DIAGNOSIS — F41.9 ANXIETY AND DEPRESSION: ICD-10-CM

## 2023-02-09 DIAGNOSIS — Z3A.08 8 WEEKS GESTATION OF PREGNANCY: Primary | ICD-10-CM

## 2023-02-09 DIAGNOSIS — O99.320 MARIJUANA USE DURING PREGNANCY: ICD-10-CM

## 2023-02-09 DIAGNOSIS — F32.A ANXIETY AND DEPRESSION: ICD-10-CM

## 2023-02-09 DIAGNOSIS — F12.90 MARIJUANA USE DURING PREGNANCY: ICD-10-CM

## 2023-02-09 PROCEDURE — 99214 OFFICE O/P EST MOD 30 MIN: CPT

## 2023-02-09 RX ORDER — ONDANSETRON HYDROCHLORIDE 8 MG/1
8 TABLET, FILM COATED ORAL EVERY 8 HOURS PRN
COMMUNITY
End: 2023-03-06 | Stop reason: SDUPTHER
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
## 2023-03-01 ENCOUNTER — REFERRAL TRIAGE (OUTPATIENT)
Dept: LABOR AND DELIVERY | Facility: HOSPITAL | Age: 23
End: 2023-03-01
Payer: MEDICAID

## 2023-03-06 ENCOUNTER — ROUTINE PRENATAL (OUTPATIENT)
Dept: OBSTETRICS AND GYNECOLOGY | Facility: CLINIC | Age: 23
End: 2023-03-06
Payer: MEDICAID

## 2023-03-06 ENCOUNTER — LAB (OUTPATIENT)
Dept: LAB | Facility: HOSPITAL | Age: 23
End: 2023-03-06
Payer: MEDICAID

## 2023-03-06 VITALS — DIASTOLIC BLOOD PRESSURE: 76 MMHG | BODY MASS INDEX: 35.81 KG/M2 | WEIGHT: 195.8 LBS | SYSTOLIC BLOOD PRESSURE: 110 MMHG

## 2023-03-06 DIAGNOSIS — E66.9 OBESITY (BMI 30-39.9): ICD-10-CM

## 2023-03-06 DIAGNOSIS — F41.9 ANXIETY AND DEPRESSION: ICD-10-CM

## 2023-03-06 DIAGNOSIS — O09.299 HISTORY OF MISCARRIAGE, CURRENTLY PREGNANT, UNSPECIFIED TRIMESTER: ICD-10-CM

## 2023-03-06 DIAGNOSIS — O99.211 OBESITY AFFECTING PREGNANCY IN FIRST TRIMESTER: ICD-10-CM

## 2023-03-06 DIAGNOSIS — Z34.01 ENCOUNTER FOR SUPERVISION OF NORMAL FIRST PREGNANCY IN FIRST TRIMESTER: Primary | ICD-10-CM

## 2023-03-06 DIAGNOSIS — O21.9 NAUSEA AND VOMITING DURING PREGNANCY: ICD-10-CM

## 2023-03-06 DIAGNOSIS — Z3A.12 12 WEEKS GESTATION OF PREGNANCY: ICD-10-CM

## 2023-03-06 DIAGNOSIS — O99.320 MARIJUANA USE DURING PREGNANCY: ICD-10-CM

## 2023-03-06 DIAGNOSIS — Z36.89 ENCOUNTER FOR FETAL ANATOMIC SURVEY: ICD-10-CM

## 2023-03-06 DIAGNOSIS — F32.A ANXIETY AND DEPRESSION: ICD-10-CM

## 2023-03-06 DIAGNOSIS — F12.90 MARIJUANA USE DURING PREGNANCY: ICD-10-CM

## 2023-03-06 PROBLEM — Z34.90 SUPERVISION OF NORMAL PREGNANCY: Status: ACTIVE | Noted: 2023-03-06

## 2023-03-06 LAB — GLUCOSE 1H P 100 G GLC PO SERPL-MCNC: 111 MG/DL (ref 65–139)

## 2023-03-06 PROCEDURE — 82950 GLUCOSE TEST: CPT

## 2023-03-06 PROCEDURE — 99213 OFFICE O/P EST LOW 20 MIN: CPT | Performed by: OBSTETRICS & GYNECOLOGY

## 2023-03-06 PROCEDURE — 36415 COLL VENOUS BLD VENIPUNCTURE: CPT

## 2023-03-06 RX ORDER — ONDANSETRON HYDROCHLORIDE 8 MG/1
8 TABLET, FILM COATED ORAL EVERY 8 HOURS PRN
Qty: 30 TABLET | Refills: 6 | Status: SHIPPED | OUTPATIENT
Start: 2023-03-06

## 2023-03-06 NOTE — PROGRESS NOTES
CC: Prenatal visit    Nikkie Ross is a 22 y.o.  at 12w1d.  Doing well.  Denies contractions, LOF, or VB.  Has continued to have nausea.    /76   Wt 88.8 kg (195 lb 12.8 oz)   LMP 2022 (Exact Date)   BMI 35.81 kg/m²   Fetal Heart Rate: 148     Problems (from 23 to present)     Problem Noted Resolved    Supervision of normal pregnancy 3/6/2023 by Lucille Tobin MD No    Obesity affecting pregnancy in first trimester 3/6/2023 by Lucille Tobin MD No    Overview Addendum 3/6/2023  1:55 PM by Lucille Tobin MD     Class II obesity, PG BMI 35.8  Early glucola ordered         Marijuana use during pregnancy 2023 by Greta Gifford APRN No    Overview Signed 2023  3:00 PM by Greta Gifford APRN     Smokes twice a day.   Smoking cessation completed.          Anxiety and depression 2023 by Greta Gifford APRN No    Overview Signed 2023  3:01 PM by Greta Gifford APRN     HX of anxiety and depression, not currently on medications.              A/P: Nikkie Ross is a 22 y.o.  at 12w1d.  - RTC in 4 weeks  - RTC in 8 weeks w/ anatomy US  - Reviewed COVID-19 visitation policy  - Reviewed COVID-19 precautions     Diagnosis Plan   1. Encounter for supervision of normal first pregnancy in first trimester        2. Marijuana use during pregnancy        3. Anxiety and depression        4. Obesity affecting pregnancy in first trimester        5. 12 weeks gestation of pregnancy        6. Encounter for fetal anatomic survey  US Ob 14 + Weeks Single or First Gestation      7. Nausea and vomiting during pregnancy  ondansetron (ZOFRAN) 8 MG tablet        Lucille Tobin MD  3/6/2023  14:09 CST

## 2023-03-21 ENCOUNTER — PATIENT OUTREACH (OUTPATIENT)
Dept: LABOR AND DELIVERY | Facility: HOSPITAL | Age: 23
End: 2023-03-21
Payer: MEDICAID

## 2023-03-21 NOTE — OUTREACH NOTE
[facility]  Unable to Reach       Questions/Answers    Flowsheet Row Responses   Pending Outreach Confirm Patient Interest   Call Attempt First   Outcome No answer/busy   Next Call Attempt Date 03/27/23   Unable to reach comments: no voicemail set up          Patient was unable to be reached. Doesn't have voicemail set up on phone. I also sent a [employer] message. Will try to call again on March 27 for a second time.    Sydney Claros RN  Maternity Nurse Navigator    3/21/2023, 17:37 CDT

## 2023-03-27 ENCOUNTER — PATIENT OUTREACH (OUTPATIENT)
Dept: LABOR AND DELIVERY | Facility: HOSPITAL | Age: 23
End: 2023-03-27
Payer: MEDICAID

## 2023-03-27 NOTE — OUTREACH NOTE
[facility]  Unable to Reach       Questions/Answers    Flowsheet Row Responses   Pending Outreach Confirm Patient Interest   Call Attempt Second   Outcome No answer/busy   Next Call Attempt Date 04/03/23   Unable to reach comments: no voicemail set up          Called both the cell phone number and home phone number and no answer. No voicemail set up. Will try for a 3rd time on April 3rd if unavailable will close patient out.    Sydney Claros RN  Maternity Nurse Navigator    3/27/2023, 18:16 CDT

## 2023-04-03 ENCOUNTER — PATIENT OUTREACH (OUTPATIENT)
Dept: LABOR AND DELIVERY | Facility: HOSPITAL | Age: 23
End: 2023-04-03
Payer: MEDICAID

## 2023-04-03 ENCOUNTER — ROUTINE PRENATAL (OUTPATIENT)
Dept: OBSTETRICS AND GYNECOLOGY | Facility: CLINIC | Age: 23
End: 2023-04-03
Payer: MEDICAID

## 2023-04-03 VITALS — WEIGHT: 207 LBS | SYSTOLIC BLOOD PRESSURE: 106 MMHG | BODY MASS INDEX: 37.86 KG/M2 | DIASTOLIC BLOOD PRESSURE: 70 MMHG

## 2023-04-03 DIAGNOSIS — O99.211 OBESITY AFFECTING PREGNANCY IN FIRST TRIMESTER: ICD-10-CM

## 2023-04-03 DIAGNOSIS — F12.90 MARIJUANA USE DURING PREGNANCY: ICD-10-CM

## 2023-04-03 DIAGNOSIS — F32.A ANXIETY AND DEPRESSION: ICD-10-CM

## 2023-04-03 DIAGNOSIS — F41.9 ANXIETY AND DEPRESSION: ICD-10-CM

## 2023-04-03 DIAGNOSIS — Z3A.16 16 WEEKS GESTATION OF PREGNANCY: Primary | ICD-10-CM

## 2023-04-03 DIAGNOSIS — O99.320 MARIJUANA USE DURING PREGNANCY: ICD-10-CM

## 2023-04-03 PROCEDURE — 99213 OFFICE O/P EST LOW 20 MIN: CPT

## 2023-04-03 NOTE — PROGRESS NOTES
CC: Prenatal visit    Nikkie Ross is a 22 y.o.  at 16w1d.  Doing well.  Denies contractions, LOF, or VB.  Reports good FM. Nausea is improving.     /70   Wt 93.9 kg (207 lb)   LMP 2022 (Exact Date)   BMI 37.86 kg/m²   SVE: NA     Fetal Heart Rate: 149     Problems (from 23 to present)     Problem Noted Resolved    Supervision of normal pregnancy 3/6/2023 by Lucille Tobin MD No    Obesity affecting pregnancy in first trimester 3/6/2023 by Lucille Tobin MD No    Overview Addendum 3/6/2023  1:55 PM by Lucille Tobin MD     Class II obesity, PG BMI 35.8  Early glucola ordered         Marijuana use during pregnancy 2023 by Greta Gifford APRN No    Overview Signed 2023  3:00 PM by Greta Gifford APRN     Smokes twice a day.   Smoking cessation completed.          Anxiety and depression 2023 by Greta Gifford APRN No    Overview Signed 2023  3:01 PM by Greta Gifford APRN     HX of anxiety and depression, not currently on medications.                A/P: Nikkie Ross is a 22 y.o.  at 16w1d.  - RTC in 4 weeks with anatomy      Diagnosis Plan   1. 16 weeks gestation of pregnancy        2. Anxiety and depression        3. Marijuana use during pregnancy        4. Obesity affecting pregnancy in first trimester          TOSHA Sethi  4/3/2023  10:12 CDT

## 2023-04-03 NOTE — OUTREACH NOTE
[facility]  Unable to Reach       Questions/Answers    Flowsheet Row Responses   Pending Outreach Confirm Patient Interest   Call Attempt Third   Outcome No answer/busy   Unable to reach comments: closing out pt          Closed out patient today. Today was the third time trying to reach patient.    Sydney Claros RN  Maternity Nurse Navigator    4/3/2023, 08:59 CDT

## 2023-05-01 ENCOUNTER — ROUTINE PRENATAL (OUTPATIENT)
Dept: OBSTETRICS AND GYNECOLOGY | Facility: CLINIC | Age: 23
End: 2023-05-01
Payer: MEDICAID

## 2023-05-01 VITALS — WEIGHT: 211.6 LBS | BODY MASS INDEX: 38.7 KG/M2 | SYSTOLIC BLOOD PRESSURE: 112 MMHG | DIASTOLIC BLOOD PRESSURE: 68 MMHG

## 2023-05-01 DIAGNOSIS — F12.90 MARIJUANA USE DURING PREGNANCY: ICD-10-CM

## 2023-05-01 DIAGNOSIS — O99.212 OBESITY AFFECTING PREGNANCY IN SECOND TRIMESTER: ICD-10-CM

## 2023-05-01 DIAGNOSIS — O99.320 MARIJUANA USE DURING PREGNANCY: ICD-10-CM

## 2023-05-01 DIAGNOSIS — F32.A ANXIETY AND DEPRESSION: ICD-10-CM

## 2023-05-01 DIAGNOSIS — Z3A.20 20 WEEKS GESTATION OF PREGNANCY: ICD-10-CM

## 2023-05-01 DIAGNOSIS — F41.9 ANXIETY AND DEPRESSION: ICD-10-CM

## 2023-05-01 DIAGNOSIS — Z34.02 ENCOUNTER FOR SUPERVISION OF NORMAL FIRST PREGNANCY IN SECOND TRIMESTER: Primary | ICD-10-CM

## 2023-05-01 NOTE — PROGRESS NOTES
CC: Prenatal visit    Nikkie Ross is a 22 y.o.  at 20w1d.  Doing well.  Denies contractions, LOF, or VB.  Reports good FM.    /68   Wt 96 kg (211 lb 9.6 oz)   LMP 2022 (Exact Date)   BMI 38.70 kg/m²      Fetal Heart Rate: 144     Prelim US- EFW 365g (70%ile) w/ AC 57%ile, KHADAR 14.7 cm, breech, placenta posterior w/o previa, anatomy WNL, BOY, CL 4.26 cm, R and L ovary WNL     Problems (from 23 to present)     Problem Noted Resolved    Supervision of normal pregnancy 3/6/2023 by Lucille Tobin MD No    Obesity affecting pregnancy in second trimester 3/6/2023 by Lucille Tobin MD No    Overview Addendum 2023 10:37 AM by Lucille Tobin MD     Class II obesity, PG BMI 35.8  Early glucola WNL         Marijuana use during pregnancy 2023 by Greta Gifford APRN No    Overview Signed 2023  3:00 PM by Greta Gifford APRN     Smokes twice a day.   Smoking cessation completed.          Anxiety and depression 2023 by Greta Gifford APRN No    Overview Signed 2023  3:01 PM by Greta Gifford APRN     HX of anxiety and depression, not currently on medications.              A/P: Nikkie Ross is a 22 y.o.  at 20w1d.  - RTC in 4 weeks  - RTC in 8 weeks w/ 3T labs, Tdap, breastpump script  - Discussed no add'l US indicated unless new indications arise     Diagnosis Plan   1. Encounter for supervision of normal first pregnancy in second trimester  CBC (No Diff)    Glucose, Post 50 Gm Glucola      2. Anxiety and depression        3. Marijuana use during pregnancy        4. Obesity affecting pregnancy in second trimester        5. 20 weeks gestation of pregnancy          Lucille Tobin MD  2023  10:45 CDT

## 2023-06-05 ENCOUNTER — ROUTINE PRENATAL (OUTPATIENT)
Dept: OBSTETRICS AND GYNECOLOGY | Facility: CLINIC | Age: 23
End: 2023-06-05
Payer: MEDICAID

## 2023-06-05 VITALS — SYSTOLIC BLOOD PRESSURE: 110 MMHG | WEIGHT: 215 LBS | BODY MASS INDEX: 39.32 KG/M2 | DIASTOLIC BLOOD PRESSURE: 64 MMHG

## 2023-06-05 DIAGNOSIS — O99.212 OBESITY AFFECTING PREGNANCY IN SECOND TRIMESTER: ICD-10-CM

## 2023-06-05 DIAGNOSIS — F32.A ANXIETY AND DEPRESSION: ICD-10-CM

## 2023-06-05 DIAGNOSIS — Z34.02 ENCOUNTER FOR SUPERVISION OF NORMAL FIRST PREGNANCY IN SECOND TRIMESTER: ICD-10-CM

## 2023-06-05 DIAGNOSIS — O99.320 MARIJUANA USE DURING PREGNANCY: ICD-10-CM

## 2023-06-05 DIAGNOSIS — F12.90 MARIJUANA USE DURING PREGNANCY: ICD-10-CM

## 2023-06-05 DIAGNOSIS — Z3A.25 25 WEEKS GESTATION OF PREGNANCY: Primary | ICD-10-CM

## 2023-06-05 DIAGNOSIS — F41.9 ANXIETY AND DEPRESSION: ICD-10-CM

## 2023-06-05 PROCEDURE — 99213 OFFICE O/P EST LOW 20 MIN: CPT | Performed by: NURSE PRACTITIONER

## 2023-06-05 NOTE — PROGRESS NOTES
CC: Prenatal visit    Nikkie Ross is a 23 y.o.  at 25w1d.  Doing well.  No complaints.  Denies contractions, LOF, or VB.  Reports good FM.    /64   Wt 97.5 kg (215 lb)   LMP 2022 (Exact Date)   BMI 39.32 kg/m²              Problems (from 23 to present)       Problem Noted Resolved    Supervision of normal pregnancy 3/6/2023 by Lucille Tobin MD No    Obesity affecting pregnancy in second trimester 3/6/2023 by Lucille Tobin MD No    Overview Addendum 2023 10:37 AM by Lucille Tobin MD     Class II obesity, PG BMI 35.8  Early glucola WNL         Marijuana use during pregnancy 2023 by Greta Gifford APRN No    Overview Signed 2023  3:00 PM by Greta Gifford APRN     Smokes twice a day.   Smoking cessation completed.          Anxiety and depression 2023 by Greta Gifford APRN No    Overview Signed 2023  3:01 PM by Greta Gifford APRN     HX of anxiety and depression, not currently on medications.                  A/P: Nikkie Ross is a 23 y.o.  at 25w1d.  3 T labs, Tdap, and breast pump has previously ordered   - RTC in 4 weeks     Diagnosis Plan   1. 25 weeks gestation of pregnancy        2. Encounter for supervision of normal first pregnancy in second trimester        3. Anxiety and depression        4. Marijuana use during pregnancy        5. Obesity affecting pregnancy in second trimester            TOSHA Luciano  2023  10:27 CDT

## 2023-06-26 PROBLEM — O99.213 OBESITY AFFECTING PREGNANCY IN THIRD TRIMESTER: Status: ACTIVE | Noted: 2023-03-06

## 2023-07-24 ENCOUNTER — ROUTINE PRENATAL (OUTPATIENT)
Dept: OBSTETRICS AND GYNECOLOGY | Facility: CLINIC | Age: 23
End: 2023-07-24
Payer: MEDICAID

## 2023-07-24 VITALS — DIASTOLIC BLOOD PRESSURE: 70 MMHG | SYSTOLIC BLOOD PRESSURE: 116 MMHG | WEIGHT: 228 LBS | BODY MASS INDEX: 41.7 KG/M2

## 2023-07-24 DIAGNOSIS — O99.213 OBESITY AFFECTING PREGNANCY IN THIRD TRIMESTER: ICD-10-CM

## 2023-07-24 DIAGNOSIS — F32.A ANXIETY AND DEPRESSION: ICD-10-CM

## 2023-07-24 DIAGNOSIS — O99.320 MARIJUANA USE DURING PREGNANCY: ICD-10-CM

## 2023-07-24 DIAGNOSIS — F12.90 MARIJUANA USE DURING PREGNANCY: ICD-10-CM

## 2023-07-24 DIAGNOSIS — F41.9 ANXIETY AND DEPRESSION: ICD-10-CM

## 2023-07-24 DIAGNOSIS — Z34.03 ENCOUNTER FOR SUPERVISION OF NORMAL FIRST PREGNANCY IN THIRD TRIMESTER: ICD-10-CM

## 2023-07-24 DIAGNOSIS — O24.410 DIET CONTROLLED GESTATIONAL DIABETES MELLITUS (GDM), ANTEPARTUM: Primary | ICD-10-CM

## 2023-07-24 DIAGNOSIS — Z3A.32 32 WEEKS GESTATION OF PREGNANCY: ICD-10-CM

## 2023-07-24 PROCEDURE — 99214 OFFICE O/P EST MOD 30 MIN: CPT

## 2023-07-24 NOTE — PROGRESS NOTES
CC: Prenatal visit    Nikkie Ross is a 23 y.o.  at 32w1d.  Doing well.  Denies contractions, LOF, or VB.  Reports good FM.  She is having a hard time remembering to take her glucose due to working second shift.   Glucoses provided verbally from pt fasting  1hr pp highest 148.     Prelim US: Cephalic, KHADAR: 11.50cm, BPD: 45.0%, HC: 21.7%, AC: 65.8%, EFW: 1990g 4lb6oz, 51.2%, BPP 8/8.     /70   Wt 103 kg (228 lb)   LMP 2022 (Exact Date)   BMI 41.70 kg/m²   SVE: na     Fetal Heart Rate: 138us     Problems (from 23 to present)       Problem Noted Resolved    Diet controlled gestational diabetes mellitus (GDM), antepartum 2023 by Greta Gifford APRN No    Supervision of normal pregnancy 3/6/2023 by Lucille Tobin MD No    Obesity affecting pregnancy in third trimester 3/6/2023 by Lucille Tobin MD No    Overview Addendum 2023 10:37 AM by Lucille Tobin MD     Class II obesity, PG BMI 35.8  Early glucola WNL         Marijuana use during pregnancy 2023 by Greta Gifford APRN No    Overview Signed 2023  3:00 PM by Greta Gifford APRN     Smokes twice a day.   Smoking cessation completed.          Anxiety and depression 2023 by Greta Gifford APRN No    Overview Signed 2023  3:01 PM by Greta Gifford APRN     HX of anxiety and depression, not currently on medications.                  A/P: Nikkie Ross is a 23 y.o.  at 32w1d.  - RTC in 2 weeks with bpp   - Paper log given to pt to bring back to next appointment.   - Discussed with pt to try and work with her schedule. When ever she gets up, that will be her fasting glucose, then still follow checking sugars 1hr after eating. Set a reminder on phone to take glucose.      Diagnosis Plan   1. 32 weeks gestation of pregnancy        2. Encounter for supervision of normal first pregnancy in third trimester        3. Anxiety and depression        4.  Marijuana use during pregnancy        5. Obesity affecting pregnancy in third trimester        6. Diet controlled gestational diabetes mellitus (GDM), antepartum          Greta Gifford, APRN  7/24/2023  09:32 CDT

## 2023-07-31 ENCOUNTER — ROUTINE PRENATAL (OUTPATIENT)
Dept: OBSTETRICS AND GYNECOLOGY | Facility: CLINIC | Age: 23
End: 2023-07-31
Payer: MEDICAID

## 2023-07-31 VITALS — WEIGHT: 229.8 LBS | BODY MASS INDEX: 42.03 KG/M2 | DIASTOLIC BLOOD PRESSURE: 76 MMHG | SYSTOLIC BLOOD PRESSURE: 118 MMHG

## 2023-07-31 DIAGNOSIS — F32.A ANXIETY AND DEPRESSION: ICD-10-CM

## 2023-07-31 DIAGNOSIS — O99.320 MARIJUANA USE DURING PREGNANCY: ICD-10-CM

## 2023-07-31 DIAGNOSIS — O24.415 GESTATIONAL DIABETES MELLITUS (GDM) IN THIRD TRIMESTER CONTROLLED ON ORAL HYPOGLYCEMIC DRUG: ICD-10-CM

## 2023-07-31 DIAGNOSIS — O99.213 OBESITY AFFECTING PREGNANCY IN THIRD TRIMESTER: ICD-10-CM

## 2023-07-31 DIAGNOSIS — F41.9 ANXIETY AND DEPRESSION: ICD-10-CM

## 2023-07-31 DIAGNOSIS — O09.93 SUPERVISION OF HIGH RISK PREGNANCY IN THIRD TRIMESTER: Primary | ICD-10-CM

## 2023-07-31 DIAGNOSIS — Z3A.33 33 WEEKS GESTATION OF PREGNANCY: ICD-10-CM

## 2023-07-31 DIAGNOSIS — F12.90 MARIJUANA USE DURING PREGNANCY: ICD-10-CM

## 2023-07-31 PROCEDURE — 99214 OFFICE O/P EST MOD 30 MIN: CPT | Performed by: OBSTETRICS & GYNECOLOGY

## 2023-08-07 ENCOUNTER — HOSPITAL ENCOUNTER (OUTPATIENT)
Dept: ULTRASOUND IMAGING | Facility: HOSPITAL | Age: 23
Discharge: HOME | End: 2023-08-07
Admitting: OBSTETRICS & GYNECOLOGY
Payer: MEDICAID

## 2023-08-07 ENCOUNTER — ROUTINE PRENATAL (OUTPATIENT)
Dept: OBSTETRICS AND GYNECOLOGY | Facility: CLINIC | Age: 23
End: 2023-08-07
Payer: MEDICAID

## 2023-08-07 VITALS — BODY MASS INDEX: 42.43 KG/M2 | SYSTOLIC BLOOD PRESSURE: 120 MMHG | WEIGHT: 232 LBS | DIASTOLIC BLOOD PRESSURE: 68 MMHG

## 2023-08-07 DIAGNOSIS — O99.320 MARIJUANA USE DURING PREGNANCY: ICD-10-CM

## 2023-08-07 DIAGNOSIS — Z3A.34 34 WEEKS GESTATION OF PREGNANCY: Primary | ICD-10-CM

## 2023-08-07 DIAGNOSIS — O09.93 SUPERVISION OF HIGH RISK PREGNANCY IN THIRD TRIMESTER: ICD-10-CM

## 2023-08-07 DIAGNOSIS — O24.410 DIET CONTROLLED GESTATIONAL DIABETES MELLITUS (GDM) IN THIRD TRIMESTER: ICD-10-CM

## 2023-08-07 DIAGNOSIS — O24.415 GESTATIONAL DIABETES MELLITUS (GDM) IN THIRD TRIMESTER CONTROLLED ON ORAL HYPOGLYCEMIC DRUG: ICD-10-CM

## 2023-08-07 DIAGNOSIS — F32.A ANXIETY AND DEPRESSION: ICD-10-CM

## 2023-08-07 DIAGNOSIS — F12.90 MARIJUANA USE DURING PREGNANCY: ICD-10-CM

## 2023-08-07 DIAGNOSIS — O99.213 OBESITY AFFECTING PREGNANCY IN THIRD TRIMESTER: ICD-10-CM

## 2023-08-07 DIAGNOSIS — F41.9 ANXIETY AND DEPRESSION: ICD-10-CM

## 2023-08-07 PROCEDURE — 76819 FETAL BIOPHYS PROFIL W/O NST: CPT

## 2023-08-07 NOTE — PROGRESS NOTES
CC: Prenatal visit    Nikkie Ross is a 23 y.o.  at 34w1d.  Doing well.  Denies contractions, LOF, or VB.  Reports good FM.    BPP:Cephalic, posterior, KHADAR: 14.48cm, BPP 8/8.     Glucose  Fasting 102, 96, 103, 93, 100, 94, 104  B- 1 abnormal (159)  L- 1 abnormal (146)  D- 2 abnormal (172, 149)  Pt is taking Metformin at night time, making her nauseated, but overall tolerating.   After discussing with pt, fastings are not actually fasting. She is eating around 6 and checking them around 9 or 10.  Discussed with pt that fasting should be 6-8 hours of no eating or drinking.     /68   Wt 105 kg (232 lb)   LMP 2022 (Exact Date)   BMI 42.43 kg/mý   SVE: na     Fetal Heart Rate: 144us     Problems (from 23 to present)       Problem Noted Resolved    Gestational diabetes mellitus (GDM) in third trimester controlled on oral hypoglycemic drug 2023 by Greta Gifford APRN No    Supervision of high risk pregnancy in third trimester 3/6/2023 by Lucille Tobin MD No    Obesity affecting pregnancy in third trimester 3/6/2023 by Lucille Tobin MD No    Overview Addendum 2023 10:37 AM by Lucille Tobin MD     Class II obesity, PG BMI 35.8  Early glucola WNL         Marijuana use during pregnancy 2023 by Greta Gifford APRN No    Overview Signed 2023  3:00 PM by Greta Gifford APRN     Smokes twice a day.   Smoking cessation completed.          Anxiety and depression 2023 by Greta Gifford APRN No    Overview Signed 2023  3:01 PM by Greta Gifford APRN     HX of anxiety and depression, not currently on medications.                  A/P: Nikkie Ross is a 23 y.o.  at 34w1d.  - RTC in 1 weeks with BPP and GBS swab   - Fast for 6-8 hours and continue Metformin. Discussed possible need to increase Metformin at next appointment if levels do not come down. Pt v/u.      Diagnosis Plan   1. 34 weeks gestation of pregnancy         2. Marijuana use during pregnancy        3. Anxiety and depression        4. Supervision of high risk pregnancy in third trimester        5. Obesity affecting pregnancy in third trimester        6. Gestational diabetes mellitus (GDM) in third trimester controlled on oral hypoglycemic drug          TOSHA Sethi  8/7/2023  10:18 CDT

## 2023-08-14 ENCOUNTER — ROUTINE PRENATAL (OUTPATIENT)
Dept: OBSTETRICS AND GYNECOLOGY | Facility: CLINIC | Age: 23
End: 2023-08-14
Payer: MEDICAID

## 2023-08-14 VITALS — BODY MASS INDEX: 42.25 KG/M2 | SYSTOLIC BLOOD PRESSURE: 118 MMHG | WEIGHT: 231 LBS | DIASTOLIC BLOOD PRESSURE: 76 MMHG

## 2023-08-14 DIAGNOSIS — O09.93 SUPERVISION OF HIGH RISK PREGNANCY IN THIRD TRIMESTER: ICD-10-CM

## 2023-08-14 DIAGNOSIS — F12.90 MARIJUANA USE DURING PREGNANCY: ICD-10-CM

## 2023-08-14 DIAGNOSIS — O99.320 MARIJUANA USE DURING PREGNANCY: ICD-10-CM

## 2023-08-14 DIAGNOSIS — F41.9 ANXIETY AND DEPRESSION: ICD-10-CM

## 2023-08-14 DIAGNOSIS — O99.213 OBESITY AFFECTING PREGNANCY IN THIRD TRIMESTER: ICD-10-CM

## 2023-08-14 DIAGNOSIS — Z3A.35 35 WEEKS GESTATION OF PREGNANCY: Primary | ICD-10-CM

## 2023-08-14 DIAGNOSIS — F32.A ANXIETY AND DEPRESSION: ICD-10-CM

## 2023-08-14 DIAGNOSIS — O24.415 GESTATIONAL DIABETES MELLITUS (GDM) IN THIRD TRIMESTER CONTROLLED ON ORAL HYPOGLYCEMIC DRUG: ICD-10-CM

## 2023-08-14 PROCEDURE — 99214 OFFICE O/P EST MOD 30 MIN: CPT | Performed by: NURSE PRACTITIONER

## 2023-08-14 PROCEDURE — 87653 STREP B DNA AMP PROBE: CPT | Performed by: NURSE PRACTITIONER

## 2023-08-14 NOTE — PROGRESS NOTES
CC: Prenatal visit    Nikkie Ross is a 23 y.o.  at 35w1d.  Doing well.  No complaints.  She has been fasting now after midnight before checking blood sugar.  Denies contractions, LOF, or VB.  Reports good FM.    LMP 2022 (Exact Date)   Reviewed [facility] log:   Fastin/7 abnormal ()  All postprandials WNL, except 1 dinner (149)    Preliminary TAUS- BPP /, KHADAR 13.94 cm, MVP 3.82 cm     SVE: no     RV GBS swab obtained           Problems (from 23 to present)       Problem Noted Resolved    Gestational diabetes mellitus (GDM) in third trimester controlled on oral hypoglycemic drug 2023 by Greta Gifford APRN No    Supervision of high risk pregnancy in third trimester 3/6/2023 by Lucille Tobin MD No    Obesity affecting pregnancy in third trimester 3/6/2023 by Lucille Tobin MD No    Overview Addendum 2023 10:37 AM by Lucille Tobin MD     Class II obesity, PG BMI 35.8  Early glucola WNL         Marijuana use during pregnancy 2023 by Greta Gifford APRN No    Overview Signed 2023  3:00 PM by Greta Gifford APRN     Smokes twice a day.   Smoking cessation completed.          Anxiety and depression 2023 by Greta Gifford APRN No    Overview Signed 2023  3:01 PM by Greta Gifford APRN     HX of anxiety and depression, not currently on medications.                  A/P: Nikkie Ross is a 23 y.o.  at 35w1d.  - RTC in 1 weeks for FGS and BPP, OB appt      Diagnosis Plan   1. 35 weeks gestation of pregnancy        2. Supervision of high risk pregnancy in third trimester  US fetal biophysical profile no stress      3. Obesity affecting pregnancy in third trimester  US fetal biophysical profile no stress      4. Gestational diabetes mellitus (GDM) in third trimester controlled on oral hypoglycemic drug  US fetal biophysical profile no stress      5. Marijuana use during pregnancy        6. Anxiety and  dipesh Castellanosfield, TOSHA  8/14/2023  09:00 CDT

## 2023-08-15 LAB — GROUP B STREP, DNA: NEGATIVE

## 2023-08-21 ENCOUNTER — ROUTINE PRENATAL (OUTPATIENT)
Dept: OBSTETRICS AND GYNECOLOGY | Facility: CLINIC | Age: 23
End: 2023-08-21
Payer: MEDICAID

## 2023-08-21 VITALS — WEIGHT: 234 LBS | DIASTOLIC BLOOD PRESSURE: 68 MMHG | BODY MASS INDEX: 42.8 KG/M2 | SYSTOLIC BLOOD PRESSURE: 120 MMHG

## 2023-08-21 DIAGNOSIS — O99.320 MARIJUANA USE DURING PREGNANCY: ICD-10-CM

## 2023-08-21 DIAGNOSIS — O09.93 SUPERVISION OF HIGH RISK PREGNANCY IN THIRD TRIMESTER: Primary | ICD-10-CM

## 2023-08-21 DIAGNOSIS — O99.213 OBESITY AFFECTING PREGNANCY IN THIRD TRIMESTER: ICD-10-CM

## 2023-08-21 DIAGNOSIS — F12.90 MARIJUANA USE DURING PREGNANCY: ICD-10-CM

## 2023-08-21 DIAGNOSIS — Z3A.36 36 WEEKS GESTATION OF PREGNANCY: ICD-10-CM

## 2023-08-21 DIAGNOSIS — F41.9 ANXIETY AND DEPRESSION: ICD-10-CM

## 2023-08-21 DIAGNOSIS — O24.415 GESTATIONAL DIABETES MELLITUS (GDM) IN THIRD TRIMESTER CONTROLLED ON ORAL HYPOGLYCEMIC DRUG: ICD-10-CM

## 2023-08-21 DIAGNOSIS — F32.A ANXIETY AND DEPRESSION: ICD-10-CM

## 2023-08-21 PROCEDURE — 0502F SUBSEQUENT PRENATAL CARE: CPT | Performed by: OBSTETRICS & GYNECOLOGY

## 2023-08-21 RX ORDER — SODIUM CHLORIDE 9 MG/ML
40 INJECTION, SOLUTION INTRAVENOUS AS NEEDED
OUTPATIENT
Start: 2023-08-21

## 2023-08-21 RX ORDER — ONDANSETRON 4 MG/1
4 TABLET, FILM COATED ORAL EVERY 6 HOURS PRN
OUTPATIENT
Start: 2023-08-21

## 2023-08-21 RX ORDER — METHYLERGONOVINE MALEATE 0.2 MG/ML
200 INJECTION INTRAVENOUS ONCE AS NEEDED
OUTPATIENT
Start: 2023-08-21

## 2023-08-21 RX ORDER — PROMETHAZINE HYDROCHLORIDE 25 MG/1
12.5 SUPPOSITORY RECTAL EVERY 6 HOURS PRN
OUTPATIENT
Start: 2023-08-21

## 2023-08-21 RX ORDER — ACETAMINOPHEN 325 MG/1
1000 TABLET ORAL EVERY 6 HOURS
OUTPATIENT
Start: 2023-08-21

## 2023-08-21 RX ORDER — PROMETHAZINE HYDROCHLORIDE 25 MG/1
25 TABLET ORAL EVERY 6 HOURS PRN
OUTPATIENT
Start: 2023-08-21

## 2023-08-21 RX ORDER — CARBOPROST TROMETHAMINE 250 UG/ML
250 INJECTION, SOLUTION INTRAMUSCULAR AS NEEDED
OUTPATIENT
Start: 2023-08-21

## 2023-08-21 RX ORDER — OXYTOCIN/0.9 % SODIUM CHLORIDE 30/500 ML
250 PLASTIC BAG, INJECTION (ML) INTRAVENOUS CONTINUOUS
OUTPATIENT
Start: 2023-08-21 | End: 2023-08-21

## 2023-08-21 RX ORDER — OXYTOCIN/0.9 % SODIUM CHLORIDE 30/500 ML
999 PLASTIC BAG, INJECTION (ML) INTRAVENOUS ONCE
OUTPATIENT
Start: 2023-08-21

## 2023-08-21 RX ORDER — SODIUM CHLORIDE 0.9 % (FLUSH) 0.9 %
10 SYRINGE (ML) INJECTION AS NEEDED
OUTPATIENT
Start: 2023-08-21

## 2023-08-21 RX ORDER — ONDANSETRON 2 MG/ML
4 INJECTION INTRAMUSCULAR; INTRAVENOUS EVERY 6 HOURS PRN
OUTPATIENT
Start: 2023-08-21

## 2023-08-21 RX ORDER — LIDOCAINE HYDROCHLORIDE 10 MG/ML
0.5 INJECTION, SOLUTION INFILTRATION; PERINEURAL ONCE AS NEEDED
OUTPATIENT
Start: 2023-08-21

## 2023-08-21 RX ORDER — MISOPROSTOL 100 UG/1
800 TABLET ORAL AS NEEDED
OUTPATIENT
Start: 2023-08-21

## 2023-08-21 RX ORDER — IBUPROFEN 200 MG
800 TABLET ORAL EVERY 8 HOURS
OUTPATIENT
Start: 2023-08-21

## 2023-08-21 RX ORDER — MISOPROSTOL 100 MCG
50 TABLET ORAL EVERY 4 HOURS
OUTPATIENT
Start: 2023-08-21 | End: 2023-08-22

## 2023-08-21 RX ORDER — SODIUM CHLORIDE, SODIUM LACTATE, POTASSIUM CHLORIDE, CALCIUM CHLORIDE 600; 310; 30; 20 MG/100ML; MG/100ML; MG/100ML; MG/100ML
125 INJECTION, SOLUTION INTRAVENOUS CONTINUOUS
OUTPATIENT
Start: 2023-08-21

## 2023-08-21 RX ORDER — SODIUM CHLORIDE 0.9 % (FLUSH) 0.9 %
10 SYRINGE (ML) INJECTION EVERY 12 HOURS SCHEDULED
OUTPATIENT
Start: 2023-08-21

## 2023-08-21 NOTE — PROGRESS NOTES
CC: Prenatal visit    Nikkie Ross is a 23 y.o.  at 36w1d.  Doing well.  Denies contractions, LOF, or VB.  Reports good FM.    /68   Wt 106 kg (234 lb)   LMP 2022 (Exact Date)   BMI 42.80 kg/mý   SVE: Declined     Fetal Heart Rate: 144    Prelim US- EFW 2862g (52%ile) w/ AC 65%ile, KHADAR 13.3 cm, cephalic, placenta posterior, BPP  Problems (from 23 to present)       Problem Noted Resolved    Gestational diabetes mellitus (GDM) in third trimester controlled on oral hypoglycemic drug 2023 by Greta Gifford APRN No    Supervision of high risk pregnancy in third trimester 3/6/2023 by Lucille Tobin MD No    Obesity affecting pregnancy in third trimester 3/6/2023 by Lucille Tobin MD No    Overview Addendum 2023 10:37 AM by Lucille Tobin MD     Class II obesity, PG BMI 35.8  Early glucola WNL         Marijuana use during pregnancy 2023 by Greta Gifford APRN No    Overview Signed 2023  3:00 PM by Greta Gifford APRN     Smokes twice a day.   Smoking cessation completed.          Anxiety and depression 2023 by Greta Gifford APRN No    Overview Signed 2023  3:01 PM by Greta Gifford APRN     HX of anxiety and depression, not currently on medications.                A/P: Nikkie Ross is a 23 y.o.  at 36w1d.  - RTC in 1 week w/ BPP  - IOL scheduled for  at 38w1d secondary to GDMA2   - Received admission packet at last appt per patient     Diagnosis Plan   1. Supervision of high risk pregnancy in third trimester        2. Marijuana use during pregnancy        3. Anxiety and depression        4. Obesity affecting pregnancy in third trimester        5. Gestational diabetes mellitus (GDM) in third trimester controlled on oral hypoglycemic drug        6. 36 weeks gestation of pregnancy          Lucille Tobin MD  2023  15:35 CDT
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
## 2023-08-29 ENCOUNTER — ROUTINE PRENATAL (OUTPATIENT)
Dept: OBSTETRICS AND GYNECOLOGY | Facility: CLINIC | Age: 23
End: 2023-08-29
Payer: MEDICAID

## 2023-08-29 VITALS — DIASTOLIC BLOOD PRESSURE: 74 MMHG | SYSTOLIC BLOOD PRESSURE: 118 MMHG | BODY MASS INDEX: 43.16 KG/M2 | WEIGHT: 236 LBS

## 2023-08-29 DIAGNOSIS — F32.A ANXIETY AND DEPRESSION: ICD-10-CM

## 2023-08-29 DIAGNOSIS — O99.320 MARIJUANA USE DURING PREGNANCY: ICD-10-CM

## 2023-08-29 DIAGNOSIS — F41.9 ANXIETY AND DEPRESSION: ICD-10-CM

## 2023-08-29 DIAGNOSIS — O24.415 GESTATIONAL DIABETES MELLITUS (GDM) IN THIRD TRIMESTER CONTROLLED ON ORAL HYPOGLYCEMIC DRUG: ICD-10-CM

## 2023-08-29 DIAGNOSIS — F12.90 MARIJUANA USE DURING PREGNANCY: ICD-10-CM

## 2023-08-29 DIAGNOSIS — O09.93 SUPERVISION OF HIGH RISK PREGNANCY IN THIRD TRIMESTER: ICD-10-CM

## 2023-08-29 DIAGNOSIS — Z3A.37 37 WEEKS GESTATION OF PREGNANCY: Primary | ICD-10-CM

## 2023-08-29 DIAGNOSIS — O99.213 OBESITY AFFECTING PREGNANCY IN THIRD TRIMESTER: ICD-10-CM

## 2023-08-29 NOTE — PROGRESS NOTES
CC: Prenatal visit    Nikkie Ross is a 23 y.o.  at 37w2d.  Doing well.  No complaints.  Denies contractions, LOF, or VB.  Reports good FM.    LMP 2022 (Exact Date)     US preliminary- BPP , fetus cephalic, KHADAR 11.95           Problems (from 23 to present)       Problem Noted Resolved    Gestational diabetes mellitus (GDM) in third trimester controlled on oral hypoglycemic drug 2023 by Greta Gifford APRN No    Supervision of high risk pregnancy in third trimester 3/6/2023 by Lucille Tobin MD No    Obesity affecting pregnancy in third trimester 3/6/2023 by Lucille Tobin MD No    Overview Addendum 2023 10:37 AM by Lucille Tobin MD     Class II obesity, PG BMI 35.8  Early glucola WNL         Marijuana use during pregnancy 2023 by Greta Gifford APRN No    Overview Signed 2023  3:00 PM by Greta Gifford APRN     Smokes twice a day.   Smoking cessation completed.          Anxiety and depression 2023 by Greta Gifford APRN No    Overview Signed 2023  3:01 PM by Greta Gifford APRN     HX of anxiety and depression, not currently on medications.                  A/P: Nikkie Ross is a 23 y.o.  at 37w2d.  Discussed breastfeeding tips  - RT L/D for IOL next Tuesday     Diagnosis Plan   1. 37 weeks gestation of pregnancy        2. Supervision of high risk pregnancy in third trimester        3. Marijuana use during pregnancy        4. Anxiety and depression        5. Obesity affecting pregnancy in third trimester        6. Gestational diabetes mellitus (GDM) in third trimester controlled on oral hypoglycemic drug  BG controlled on metformin    Recommended T2DM screening 2-3 months pp with PCP           TOSHA Luciano  2023  08:37 CDT